# Patient Record
(demographics unavailable — no encounter records)

---

## 2025-03-31 NOTE — ASSESSMENT
[FreeTextEntry1] : The patient was advised of the diagnosis. The natural history of the pathology was explained in full to the patient in layman's terms. All questions were answered. The risks and benefits of surgical and non-surgical treatment alternatives were explained in full to the patient.  Large joint injection of the left shoulder was performed. The indication for this procedure was pain, inflammation. The site was prepped with alcohol, ethyl chloride sprayed topically and sterile technique used. An injection of Lidocaine 3cc of 2% , Bupivacaine (Marcaine) 3cc of 0.5% , Triamcinolone (Kenalog) 2cc of 40 mg  was used.   Patient tolerated procedure well. Patient was advised to call if redness, pain or fever occur and apply ice for 15 minutes out of every hour for the next 12-24 hours as tolerated. The risks benefits, and alternatives have been discussed, and verbal consent was obtained.  I explained that steroids quickly control the illness by slowing the immune response. Since they slow the immune response generally and not in a specific way only against the disease, the body may have a more difficult time fighting infection and thus patients taking steroids are more susceptible to infections.  CSI #1 performed to left shoulder.  Physical therapy rx provided to patient  RTO 6 weeks

## 2025-03-31 NOTE — HISTORY OF PRESENT ILLNESS
[de-identified] : 03/31/2025  :PHILLIP CAMERON , a 86 year old male, presents today for left shoulder. On and off pain for years, pain worsened 10 days ago while playing golf.   RHJOSE GUADALUPE Pediatrician

## 2025-03-31 NOTE — IMAGING
[de-identified] : Left shoulder Abduction 150  + empty can (mild) Supination 90 Pronation 90 - posterior lift off - speed - deysi's  Left shoulder x-rays 2 views taken today in office demonstrate type 2 acromion, calcific density in subacromial space